# Patient Record
Sex: FEMALE | Race: WHITE | URBAN - METROPOLITAN AREA
[De-identification: names, ages, dates, MRNs, and addresses within clinical notes are randomized per-mention and may not be internally consistent; named-entity substitution may affect disease eponyms.]

---

## 2017-10-04 ENCOUNTER — TRANSFERRED RECORDS (OUTPATIENT)
Dept: HEALTH INFORMATION MANAGEMENT | Facility: CLINIC | Age: 57
End: 2017-10-04

## 2019-05-31 NOTE — TELEPHONE ENCOUNTER
RECORDS RECEIVED FROM: Bilateral Knee Pain, primarily R Knee, hx of dislocations/injuries, Pt seen at [facility],    DATE RECEIVED: 5/31   NOTES STATUS DETAILS   OFFICE NOTE from referring provider N/A    OFFICE NOTE from other specialist Care Everywhere [facility] 9/29/17-12/5/17   DISCHARGE SUMMARY from hospital N/A    DISCHARGE REPORT from the ER Care Everywhere 9/29/17   OPERATIVE REPORT  First surgery at right knee as a teenager (big scar, re-alignment surgery), 2nd surgery was a scope (microfracture for cartilage) in 1988     MEDICATION LIST Internal    IMPLANT RECORD/STICKER N/A    LABS     CBC/DIFF N/A    CULTURES N/A    INJECTIONS DONE IN RADIOLOGY N/A    MRI N/A    CT SCAN N/A    XRAYS (IMAGES & REPORTS) Received 10/4/17 [facility])   TUMOR     PATHOLOGY  Slides & report N/A

## 2019-06-03 ENCOUNTER — PRE VISIT (OUTPATIENT)
Dept: ORTHOPEDICS | Facility: CLINIC | Age: 59
End: 2019-06-03

## 2019-06-03 DIAGNOSIS — M17.0 BILATERAL PRIMARY OSTEOARTHRITIS OF KNEE: Primary | ICD-10-CM
